# Patient Record
Sex: MALE | Race: OTHER | NOT HISPANIC OR LATINO | URBAN - METROPOLITAN AREA
[De-identification: names, ages, dates, MRNs, and addresses within clinical notes are randomized per-mention and may not be internally consistent; named-entity substitution may affect disease eponyms.]

---

## 2022-09-30 ENCOUNTER — CLINICAL SUPPORT (OUTPATIENT)
Dept: AUDIOLOGY | Facility: CLINIC | Age: 77
End: 2022-09-30
Payer: OTHER GOVERNMENT

## 2022-09-30 ENCOUNTER — OFFICE VISIT (OUTPATIENT)
Dept: OTOLARYNGOLOGY | Facility: CLINIC | Age: 77
End: 2022-09-30
Payer: OTHER GOVERNMENT

## 2022-09-30 DIAGNOSIS — H91.92 DEAFNESS IN LEFT EAR: ICD-10-CM

## 2022-09-30 DIAGNOSIS — H90.A31 MIXED CONDUCTIVE AND SENSORINEURAL HEARING LOSS OF RIGHT EAR WITH RESTRICTED HEARING OF LEFT EAR: ICD-10-CM

## 2022-09-30 DIAGNOSIS — H90.6 MIXED CONDUCTIVE AND SENSORINEURAL HEARING LOSS, BILATERAL: Primary | ICD-10-CM

## 2022-09-30 DIAGNOSIS — H71.01 CHOLESTEATOMA OF ATTIC OF RIGHT EAR: Primary | ICD-10-CM

## 2022-09-30 DIAGNOSIS — H93.11 TINNITUS, SUBJECTIVE, RIGHT: ICD-10-CM

## 2022-09-30 DIAGNOSIS — H92.11 OTORRHEA, RIGHT: ICD-10-CM

## 2022-09-30 PROCEDURE — 92504 EAR MICROSCOPY EXAMINATION: CPT | Mod: PBBFAC | Performed by: OTOLARYNGOLOGY

## 2022-09-30 PROCEDURE — 99204 PR OFFICE/OUTPT VISIT, NEW, LEVL IV, 45-59 MIN: ICD-10-PCS | Mod: 25,57,S$PBB, | Performed by: OTOLARYNGOLOGY

## 2022-09-30 PROCEDURE — 92557 COMPREHENSIVE HEARING TEST: CPT | Mod: PBBFAC

## 2022-09-30 PROCEDURE — 99204 OFFICE O/P NEW MOD 45 MIN: CPT | Mod: 25,57,S$PBB, | Performed by: OTOLARYNGOLOGY

## 2022-09-30 PROCEDURE — 99203 OFFICE O/P NEW LOW 30 MIN: CPT | Mod: PBBFAC,27 | Performed by: OTOLARYNGOLOGY

## 2022-09-30 PROCEDURE — 99999 PR PBB SHADOW E&M-EST. PATIENT-LVL I: CPT | Mod: PBBFAC,,,

## 2022-09-30 PROCEDURE — 92504 PR EAR MICROSCOPY EXAMINATION: ICD-10-PCS | Mod: S$PBB,,, | Performed by: OTOLARYNGOLOGY

## 2022-09-30 PROCEDURE — 92504 EAR MICROSCOPY EXAMINATION: CPT | Mod: S$PBB,,, | Performed by: OTOLARYNGOLOGY

## 2022-09-30 PROCEDURE — 99999 PR PBB SHADOW E&M-EST. PATIENT-LVL I: ICD-10-PCS | Mod: PBBFAC,,,

## 2022-09-30 PROCEDURE — 99999 PR PBB SHADOW E&M-NEW PATIENT-LVL III: ICD-10-PCS | Mod: PBBFAC,,, | Performed by: OTOLARYNGOLOGY

## 2022-09-30 PROCEDURE — 99211 OFF/OP EST MAY X REQ PHY/QHP: CPT | Mod: PBBFAC

## 2022-09-30 PROCEDURE — 92567 TYMPANOMETRY: CPT | Mod: PBBFAC

## 2022-09-30 PROCEDURE — 99999 PR PBB SHADOW E&M-NEW PATIENT-LVL III: CPT | Mod: PBBFAC,,, | Performed by: OTOLARYNGOLOGY

## 2022-09-30 RX ORDER — METOPROLOL TARTRATE 25 MG/1
TABLET, FILM COATED ORAL
COMMUNITY
Start: 2021-12-10 | End: 2022-12-14

## 2022-09-30 RX ORDER — HYDROXYZINE HYDROCHLORIDE 50 MG/1
TABLET, FILM COATED ORAL
COMMUNITY

## 2022-09-30 RX ORDER — ALBUTEROL SULFATE 90 UG/1
AEROSOL, METERED RESPIRATORY (INHALATION)
COMMUNITY
Start: 2021-12-13 | End: 2022-12-14

## 2022-09-30 RX ORDER — NITROGLYCERIN 0.4 MG/1
TABLET SUBLINGUAL
COMMUNITY
Start: 2021-12-10 | End: 2022-12-14

## 2022-09-30 RX ORDER — GABAPENTIN 600 MG/1
TABLET ORAL 2 TIMES DAILY
COMMUNITY
Start: 2021-12-10

## 2022-09-30 RX ORDER — DILTIAZEM HYDROCHLORIDE 120 MG/1
CAPSULE, COATED, EXTENDED RELEASE ORAL
COMMUNITY

## 2022-09-30 RX ORDER — LANOLIN ALCOHOL/MO/W.PET/CERES
1000 CREAM (GRAM) TOPICAL
COMMUNITY
Start: 2021-12-10

## 2022-09-30 RX ORDER — ICOSAPENT ETHYL 1000 MG/1
CAPSULE ORAL
COMMUNITY
Start: 2022-04-29 | End: 2023-04-30

## 2022-09-30 RX ORDER — ACETAMINOPHEN 500 MG
TABLET ORAL
COMMUNITY

## 2022-09-30 RX ORDER — ASPIRIN 81 MG/1
81 TABLET ORAL DAILY
COMMUNITY
Start: 2022-08-22

## 2022-09-30 RX ORDER — ASPIRIN 81 MG/1
TABLET ORAL
COMMUNITY
Start: 2021-12-10 | End: 2022-12-14

## 2022-09-30 RX ORDER — ATORVASTATIN CALCIUM 80 MG/1
TABLET, FILM COATED ORAL
COMMUNITY
Start: 2021-12-10 | End: 2022-12-14

## 2022-09-30 RX ORDER — FLUTICASONE FUROATE, UMECLIDINIUM BROMIDE AND VILANTEROL TRIFENATATE 100; 62.5; 25 UG/1; UG/1; UG/1
1 POWDER RESPIRATORY (INHALATION) DAILY
COMMUNITY
Start: 2022-04-29

## 2022-09-30 RX ORDER — DORZOLAMIDE HCL 20 MG/ML
SOLUTION/ DROPS OPHTHALMIC
COMMUNITY
Start: 2021-10-26 | End: 2022-12-07

## 2022-09-30 RX ORDER — CIPROFLOXACIN AND DEXAMETHASONE 3; 1 MG/ML; MG/ML
SUSPENSION/ DROPS AURICULAR (OTIC)
Status: ON HOLD | COMMUNITY
Start: 2022-08-04 | End: 2022-12-07 | Stop reason: HOSPADM

## 2022-09-30 RX ORDER — TAMSULOSIN HYDROCHLORIDE 0.4 MG/1
CAPSULE ORAL
COMMUNITY
Start: 2021-12-10 | End: 2022-12-14

## 2022-09-30 RX ORDER — LATANOPROST 50 UG/ML
SOLUTION/ DROPS OPHTHALMIC
COMMUNITY
Start: 2021-10-26 | End: 2022-12-07

## 2022-09-30 RX ORDER — ESZOPICLONE 3 MG/1
TABLET, FILM COATED ORAL
COMMUNITY

## 2022-09-30 RX ORDER — HYDROXYZINE PAMOATE 50 MG/1
CAPSULE ORAL
COMMUNITY
Start: 2022-01-25 | End: 2023-01-26

## 2022-09-30 RX ORDER — HYDROCODONE BITARTRATE AND ACETAMINOPHEN 5; 325 MG/1; MG/1
1 TABLET ORAL 2 TIMES DAILY PRN
Status: ON HOLD | COMMUNITY
Start: 2022-09-19 | End: 2022-12-07 | Stop reason: SDUPTHER

## 2022-09-30 RX ORDER — CEPHALEXIN 500 MG/1
500 CAPSULE ORAL 2 TIMES DAILY
COMMUNITY
Start: 2022-06-27

## 2022-09-30 NOTE — PROGRESS NOTES
"Ronnie Kirby was seen today in the clinic for an audiologic evaluation. Mr. Kirby was referred for a cholesteatoma in the attic of the right ear canal. He reported he history of otorrhea from his right ear, which prevents him from wearing a hearing. Mr. Kirby reported tinnitus in the right described as "an airplane taking off". He endorsed a history of noise exposure (i.e. service in the [place] Mr. Kirby reported significant hearing loss in his left ear since the 1960's. He reported a history of a tube placed in the right ear.    Tympanometry revealed Type B with a large ear canal volume in the right ear and Type C in the left ear.     Audiogram results revealed a moderate rising to mild sloping to profound mixed hearing loss in the right ear and a profound rising to severe mixed hearing loss in the left ear.      Speech reception thresholds were noted at 40 dBHL in the right ear and 70 dBHL in the left ear.    Speech discrimination scores were 92% in the right ear and 24% in the left ear.    Recommendations:  Otologic evaluation  Hearing aid consultation pending medical management/clearance  Annual audiogram or sooner if change perceived  Hearing protection in noise        "
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
## 2022-10-03 ENCOUNTER — TELEPHONE (OUTPATIENT)
Dept: OTOLARYNGOLOGY | Facility: CLINIC | Age: 77
End: 2022-10-03
Payer: OTHER GOVERNMENT

## 2022-10-03 DIAGNOSIS — H91.92 DEAFNESS IN LEFT EAR: ICD-10-CM

## 2022-10-03 DIAGNOSIS — H71.01 CHOLESTEATOMA OF ATTIC OF RIGHT EAR: Primary | ICD-10-CM

## 2022-10-19 ENCOUNTER — TELEPHONE (OUTPATIENT)
Dept: OTOLARYNGOLOGY | Facility: CLINIC | Age: 77
End: 2022-10-19
Payer: OTHER GOVERNMENT

## 2022-10-19 NOTE — TELEPHONE ENCOUNTER
Spoke with patients  wife to follow up on medical clearance for surgery. Wife stated patient was unable to receive clearance for surgery.will need new surgery date.3rd attempt to  Fax clearance form over to cardiology.

## 2022-10-26 ENCOUNTER — TELEPHONE (OUTPATIENT)
Dept: OTOLARYNGOLOGY | Facility: CLINIC | Age: 77
End: 2022-10-26
Payer: OTHER GOVERNMENT

## 2022-11-28 ENCOUNTER — PATIENT MESSAGE (OUTPATIENT)
Dept: AUDIOLOGY | Facility: CLINIC | Age: 77
End: 2022-11-28
Payer: OTHER GOVERNMENT

## 2022-12-03 ENCOUNTER — NURSE TRIAGE (OUTPATIENT)
Dept: ADMINISTRATIVE | Facility: CLINIC | Age: 77
End: 2022-12-03
Payer: OTHER GOVERNMENT

## 2022-12-03 NOTE — TELEPHONE ENCOUNTER
Wife calling with questions regarding upcoming surgery 12/7 with Dr. Mckeon. Asking if there are meds he is supposed to stop and NPO status. Wife concerned about driving from [place] without instructions. Unable to locate information on chart, no pre admit done at this time. Per care advice, call pcp when office is open.     Reason for Disposition   [1] Caller requesting NON-URGENT health information AND [2] PCP's office is the best resource    Protocols used: Information Only Call - No Triage-A-

## 2022-12-05 ENCOUNTER — TELEPHONE (OUTPATIENT)
Dept: OTOLARYNGOLOGY | Facility: CLINIC | Age: 77
End: 2022-12-05
Payer: OTHER GOVERNMENT

## 2022-12-05 NOTE — TELEPHONE ENCOUNTER
Spoke with pt to give pre op instructions. Advised NPO after midnight and to bring in all current medications and confirmed pt stopped blood thinners. Surgery time, arrival time, and surgery location verified.

## 2022-12-06 ENCOUNTER — ANESTHESIA EVENT (OUTPATIENT)
Dept: SURGERY | Facility: HOSPITAL | Age: 77
End: 2022-12-06
Payer: OTHER GOVERNMENT

## 2022-12-06 NOTE — ANESTHESIA PREPROCEDURE EVALUATION
[facility]  Anesthesia Pre-Operative Evaluation         Patient Name/: Ronnie Kirby, 1945  MRN: [id number]    SUBJECTIVE:     Pre-operative evaluation for Procedure(s) (LRB):  TYMPANOPLASTY, WITH MASTOIDECTOMY (Right)     2022    Ronnie Kirby is a 77 y.o. male w/ a significant PMHx of HTN, HLD, CAD s/p CABG, obesity, COPD, BPH and right ear cholesteatoma.     Patient now presents for the above procedure(s).      Prev airway: None documented.    There is no problem list on file for this patient.      Review of patient's allergies indicates:   Allergen Reactions    Brimonidine Other (See Comments)       Current Inpatient Medications:       No current facility-administered medications on file prior to encounter.     Current Outpatient Medications on File Prior to Encounter   Medication Sig Dispense Refill    albuterol (PROVENTIL/VENTOLIN HFA) 90 mcg/actuation inhaler INHALE 2 INHALATIONS BY ORAL INHALATION  AS NEEDED FOR BREATHING      aspirin (ECOTRIN) 81 MG EC tablet TAKE ONE TABLET BY MOUTH DAILY TO PREVENT BLOOD CLOTS      atorvastatin (LIPITOR) 80 MG tablet TAKE ONE TABLET BY MOUTH DAILY FOR CHOLESTEROL **DOSE INCREASE**      cholecalciferol, vitamin D3, 125 mcg (5,000 unit) Tab       ciprofloxacin-dexamethasone 0.3-0.1% (CIPRODEX) 0.3-0.1 % DrpS INSTILL 4 DROPS IN RIGHT EAR TWICE A DAY AS NEEDED FOR EAR INFECTION      cyanocobalamin (VITAMIN B-12) 1000 MCG tablet 1,000 mcg.      dorzolamide (TRUSOPT) 2 % ophthalmic solution INSTILL 1 DROP IN BOTH EYES TWICE A DAY FOR GLAUCOMA      eszopiclone (LUNESTA) 3 mg Tab       gabapentin (NEURONTIN) 600 MG tablet 2 (two) times daily. 1/2 tablet      HYDROcodone-acetaminophen (NORCO) 5-325 mg per tablet Take 1 tablet by mouth 2 (two) times daily as needed.      hydrOXYzine (ATARAX) 50 MG tablet       hydrOXYzine pamoate (VISTARIL) 50 MG Cap TAKE ONE CAPSULE BY MOUTH DAILY FOR ANXIETY      icosapent ethyL (VASCEPA) 1 gram Cap  TAKE TWO CAPSULES BY MOUTH TWICE A DAY FOR HEART HEALTH AND HIGH TRIGLYCERIDES.      latanoprost 0.005 % ophthalmic solution INSTILL 1 DROP IN BOTH EYES AT BEDTIME FOR GLAUCOMA      metoprolol tartrate (LOPRESSOR) 25 MG tablet TAKE ONE TABLET BY MOUTH TWICE A DAY FOR HEART AND BLOOD PRESSURE      OXYGEN-AIR DELIVERY SYSTEMS MISC 3 L by Misc.(Non-Drug; Combo Route) route every evening.      tamsulosin (FLOMAX) 0.4 mg Cap Takes 2 tabs      TRELEGY ELLIPTA 100-62.5-25 mcg DsDv Inhale 1 puff into the lungs once daily.      aspirin (ECOTRIN) 81 MG EC tablet Take 81 mg by mouth once daily.      cephALEXin (KEFLEX) 500 MG capsule Take 500 mg by mouth 2 (two) times daily.      diltiaZEM (CARDIZEM CD) 120 MG Cp24 Take by mouth.      nitroGLYCERIN (NITROSTAT) 0.4 MG SL tablet DISSOLVE ONE TABLET UNDER THE TONGUE EVERY 5 MINUTES AS NEEDED TO MAX OF 3 TABS FOR CHEST PAIN      semaglutide (OZEMPIC) 0.25 mg or 0.5 mg(2 mg/1.5 mL) pen injector          Past Surgical History:   Procedure Laterality Date    BIOPSY OF TONSILS      CORONARY ARTERY BYPASS GRAFT      EXTRACORPOREAL SHOCK WAVE LITHOTRIPSY      HERNIA REPAIR         Social History:  Tobacco Use: Medium Risk    Smoking Tobacco Use: Former    Smokeless Tobacco Use: Never    Passive Exposure: Not on file       Alcohol Use: Not on file       OBJECTIVE:     Vital Signs Range:  BMI Readings from Last 1 Encounters:   12/07/22 34.01 kg/m²       Temp:  [36.1 °C (97 °F)]   Pulse:  [70]   Resp:  [22]   BP: (148)/(81)   SpO2:  [99 %]        Significant Labs:    No results found for: WBC, HGB, HCT, PLT, NA, K, CL, CO2, GLU, BUN, CREATININE, MG, PHOS, CALCIUM, ALBUMIN, PROT, ALKPHOS, BILITOT, AST, ALT, INR, HGBA1C     Please see Results Review for additional labs.     Diagnostic Studies: No relevant studies.    EKG:   No results found for this or any previous visit.    ECHO:  No results found for this or any previous visit.        ASSESSMENT/PLAN:       Pre-op  Assessment    I have reviewed the Patient Summary Reports.     I have reviewed the Nursing Notes.    I have reviewed the Medications.     Review of Systems  Anesthesia Hx:  No problems with previous Anesthesia  Neg history of prior surgery. Denies Family Hx of Anesthesia complications.    Social:  No Alcohol Use, Non-Smoker    Hematology/Oncology:  Hematology Normal   Oncology Normal     EENT/Dental:EENT/Dental Normal   Cardiovascular:   Hypertension hyperlipidemia    Pulmonary:  Pulmonary Normal  Denies COPD.  Denies Asthma.    Renal/:   BPH    Hepatic/GI:  Hepatic/GI Normal Denies Liver Disease.    Musculoskeletal:  Musculoskeletal Normal    Neurological:  Neurology Normal  Denies CVA. Denies Seizures.    Endocrine:  Endocrine Normal Denies Diabetes.        Physical Exam  General: Well nourished and Cooperative    Airway:  Mallampati: II   Mouth Opening: Normal  TM Distance: Normal  Tongue: Normal  Neck ROM: Normal ROM    Dental:  Dentures    Chest/Lungs:  Normal Respiratory Rate    Heart:  Rate: Normal  Rhythm: Regular Rhythm        Anesthesia Plan  Type of Anesthesia, risks & benefits discussed:    Anesthesia Type: Gen ETT  Intra-op Monitoring Plan: Standard ASA Monitors  Post Op Pain Control Plan: multimodal analgesia and IV/PO Opioids PRN  Induction:  IV  Airway Plan: Direct, Post-Induction  Informed Consent: Informed consent signed with the Patient and all parties understand the risks and agree with anesthesia plan.  All questions answered.   ASA Score: 3  Day of Surgery Review of History & Physical: H&P Update referred to the surgeon/provider.    Ready For Surgery From Anesthesia Perspective.     .

## 2022-12-07 ENCOUNTER — HOSPITAL ENCOUNTER (OUTPATIENT)
Facility: HOSPITAL | Age: 77
Discharge: HOME | End: 2022-12-07
Attending: OTOLARYNGOLOGY | Admitting: OTOLARYNGOLOGY
Payer: OTHER GOVERNMENT

## 2022-12-07 ENCOUNTER — ANESTHESIA (OUTPATIENT)
Dept: SURGERY | Facility: HOSPITAL | Age: 77
End: 2022-12-07
Payer: OTHER GOVERNMENT

## 2022-12-07 VITALS
WEIGHT: 237 LBS | TEMPERATURE: 98 F | SYSTOLIC BLOOD PRESSURE: 144 MMHG | BODY MASS INDEX: 33.93 KG/M2 | RESPIRATION RATE: 18 BRPM | DIASTOLIC BLOOD PRESSURE: 76 MMHG | OXYGEN SATURATION: 96 % | HEART RATE: 75 BPM | HEIGHT: 70 IN

## 2022-12-07 DIAGNOSIS — H71.90 CHOLESTEATOMA: ICD-10-CM

## 2022-12-07 DIAGNOSIS — H71.01 CHOLESTEATOMA OF ATTIC OF RIGHT EAR: Primary | ICD-10-CM

## 2022-12-07 LAB — POCT GLUCOSE: 121 MG/DL (ref 70–110)

## 2022-12-07 PROCEDURE — 69645 REVISE MIDDLE EAR & MASTOID: CPT | Mod: RT,,, | Performed by: OTOLARYNGOLOGY

## 2022-12-07 PROCEDURE — 88304 TISSUE EXAM BY PATHOLOGIST: CPT | Mod: 26,,, | Performed by: PATHOLOGY

## 2022-12-07 PROCEDURE — 27201423 OPTIME MED/SURG SUP & DEVICES STERILE SUPPLY: Performed by: OTOLARYNGOLOGY

## 2022-12-07 PROCEDURE — 82962 GLUCOSE BLOOD TEST: CPT | Performed by: OTOLARYNGOLOGY

## 2022-12-07 PROCEDURE — 63600175 PHARM REV CODE 636 W HCPCS: Performed by: STUDENT IN AN ORGANIZED HEALTH CARE EDUCATION/TRAINING PROGRAM

## 2022-12-07 PROCEDURE — 63600175 PHARM REV CODE 636 W HCPCS: Performed by: ANESTHESIOLOGY

## 2022-12-07 PROCEDURE — 25000003 PHARM REV CODE 250: Performed by: STUDENT IN AN ORGANIZED HEALTH CARE EDUCATION/TRAINING PROGRAM

## 2022-12-07 PROCEDURE — 37000008 HC ANESTHESIA 1ST 15 MINUTES: Performed by: OTOLARYNGOLOGY

## 2022-12-07 PROCEDURE — 25000003 PHARM REV CODE 250: Performed by: NURSE ANESTHETIST, CERTIFIED REGISTERED

## 2022-12-07 PROCEDURE — 63600175 PHARM REV CODE 636 W HCPCS: Performed by: NURSE ANESTHETIST, CERTIFIED REGISTERED

## 2022-12-07 PROCEDURE — 00120 ANES PX EAR W/BX NOS: CPT | Performed by: OTOLARYNGOLOGY

## 2022-12-07 PROCEDURE — 71000016 HC POSTOP RECOV ADDL HR: Performed by: OTOLARYNGOLOGY

## 2022-12-07 PROCEDURE — 36000708 HC OR TIME LEV III 1ST 15 MIN: Performed by: OTOLARYNGOLOGY

## 2022-12-07 PROCEDURE — D9220A PRA ANESTHESIA: Mod: ANES,,, | Performed by: ANESTHESIOLOGY

## 2022-12-07 PROCEDURE — D9220A PRA ANESTHESIA: ICD-10-PCS | Mod: ANES,,, | Performed by: ANESTHESIOLOGY

## 2022-12-07 PROCEDURE — 37000009 HC ANESTHESIA EA ADD 15 MINS: Performed by: OTOLARYNGOLOGY

## 2022-12-07 PROCEDURE — D9220A PRA ANESTHESIA: Mod: CRNA,,, | Performed by: NURSE ANESTHETIST, CERTIFIED REGISTERED

## 2022-12-07 PROCEDURE — 71000015 HC POSTOP RECOV 1ST HR: Performed by: OTOLARYNGOLOGY

## 2022-12-07 PROCEDURE — 71000044 HC DOSC ROUTINE RECOVERY FIRST HOUR: Performed by: OTOLARYNGOLOGY

## 2022-12-07 PROCEDURE — 69645 PR TYMPANOPLAS/MASTOIDEC,RADICAL/COMPLE: ICD-10-PCS | Mod: RT,,, | Performed by: OTOLARYNGOLOGY

## 2022-12-07 PROCEDURE — 88304 TISSUE EXAM BY PATHOLOGIST: CPT | Performed by: PATHOLOGY

## 2022-12-07 PROCEDURE — 88304 PR  SURG PATH,LEVEL III: ICD-10-PCS | Mod: 26,,, | Performed by: PATHOLOGY

## 2022-12-07 PROCEDURE — D9220A PRA ANESTHESIA: ICD-10-PCS | Mod: CRNA,,, | Performed by: NURSE ANESTHETIST, CERTIFIED REGISTERED

## 2022-12-07 PROCEDURE — 25000003 PHARM REV CODE 250: Performed by: OTOLARYNGOLOGY

## 2022-12-07 PROCEDURE — 36000709 HC OR TIME LEV III EA ADD 15 MIN: Performed by: OTOLARYNGOLOGY

## 2022-12-07 RX ORDER — LIDOCAINE HYDROCHLORIDE 10 MG/ML
1 INJECTION, SOLUTION EPIDURAL; INFILTRATION; INTRACAUDAL; PERINEURAL ONCE
Status: DISCONTINUED | OUTPATIENT
Start: 2022-12-07 | End: 2022-12-07 | Stop reason: HOSPADM

## 2022-12-07 RX ORDER — SODIUM CHLORIDE 0.9 % (FLUSH) 0.9 %
10 SYRINGE (ML) INJECTION
Status: DISCONTINUED | OUTPATIENT
Start: 2022-12-07 | End: 2022-12-07 | Stop reason: HOSPADM

## 2022-12-07 RX ORDER — SODIUM CHLORIDE 0.9 % (FLUSH) 0.9 %
3 SYRINGE (ML) INJECTION
Status: DISCONTINUED | OUTPATIENT
Start: 2022-12-07 | End: 2022-12-07 | Stop reason: HOSPADM

## 2022-12-07 RX ORDER — VASOPRESSIN 20 [USP'U]/ML
INJECTION, SOLUTION INTRAMUSCULAR; SUBCUTANEOUS
Status: DISCONTINUED | OUTPATIENT
Start: 2022-12-07 | End: 2022-12-07

## 2022-12-07 RX ORDER — FENTANYL CITRATE 50 UG/ML
25 INJECTION, SOLUTION INTRAMUSCULAR; INTRAVENOUS EVERY 5 MIN PRN
Status: DISCONTINUED | OUTPATIENT
Start: 2022-12-07 | End: 2022-12-07 | Stop reason: HOSPADM

## 2022-12-07 RX ORDER — DEXAMETHASONE SODIUM PHOSPHATE 4 MG/ML
INJECTION, SOLUTION INTRA-ARTICULAR; INTRALESIONAL; INTRAMUSCULAR; INTRAVENOUS; SOFT TISSUE
Status: DISCONTINUED | OUTPATIENT
Start: 2022-12-07 | End: 2022-12-07

## 2022-12-07 RX ORDER — ONDANSETRON 2 MG/ML
INJECTION INTRAMUSCULAR; INTRAVENOUS
Status: DISCONTINUED | OUTPATIENT
Start: 2022-12-07 | End: 2022-12-07

## 2022-12-07 RX ORDER — HYDROCODONE BITARTRATE AND ACETAMINOPHEN 5; 325 MG/1; MG/1
1 TABLET ORAL EVERY 6 HOURS PRN
Qty: 20 TABLET | Refills: 0 | Status: SHIPPED | OUTPATIENT
Start: 2022-12-07

## 2022-12-07 RX ORDER — LIDOCAINE HYDROCHLORIDE 20 MG/ML
INJECTION INTRAVENOUS
Status: DISCONTINUED | OUTPATIENT
Start: 2022-12-07 | End: 2022-12-07

## 2022-12-07 RX ORDER — OFLOXACIN 3 MG/ML
5 SOLUTION AURICULAR (OTIC) DAILY
Qty: 10 ML | Refills: 3 | Status: SHIPPED | OUTPATIENT
Start: 2022-12-07

## 2022-12-07 RX ORDER — KETAMINE HCL IN 0.9 % NACL 50 MG/5 ML
SYRINGE (ML) INTRAVENOUS
Status: DISCONTINUED | OUTPATIENT
Start: 2022-12-07 | End: 2022-12-07

## 2022-12-07 RX ORDER — CEFAZOLIN SODIUM 1 G/3ML
INJECTION, POWDER, FOR SOLUTION INTRAMUSCULAR; INTRAVENOUS
Status: DISCONTINUED | OUTPATIENT
Start: 2022-12-07 | End: 2022-12-07

## 2022-12-07 RX ORDER — PHENYLEPHRINE HYDROCHLORIDE 10 MG/ML
INJECTION INTRAVENOUS
Status: DISCONTINUED | OUTPATIENT
Start: 2022-12-07 | End: 2022-12-07

## 2022-12-07 RX ORDER — FENTANYL CITRATE 50 UG/ML
INJECTION, SOLUTION INTRAMUSCULAR; INTRAVENOUS
Status: DISCONTINUED | OUTPATIENT
Start: 2022-12-07 | End: 2022-12-07

## 2022-12-07 RX ORDER — LIDOCAINE HYDROCHLORIDE AND EPINEPHRINE 10; 10 MG/ML; UG/ML
INJECTION, SOLUTION INFILTRATION; PERINEURAL
Status: DISCONTINUED | OUTPATIENT
Start: 2022-12-07 | End: 2022-12-07 | Stop reason: HOSPADM

## 2022-12-07 RX ORDER — ACETAMINOPHEN 10 MG/ML
INJECTION, SOLUTION INTRAVENOUS
Status: DISCONTINUED | OUTPATIENT
Start: 2022-12-07 | End: 2022-12-07

## 2022-12-07 RX ORDER — ONDANSETRON 4 MG/1
4 TABLET, ORALLY DISINTEGRATING ORAL EVERY 8 HOURS PRN
Qty: 20 TABLET | Refills: 0 | Status: SHIPPED | OUTPATIENT
Start: 2022-12-07

## 2022-12-07 RX ORDER — PROPOFOL 10 MG/ML
VIAL (ML) INTRAVENOUS
Status: DISCONTINUED | OUTPATIENT
Start: 2022-12-07 | End: 2022-12-07

## 2022-12-07 RX ORDER — DEXMEDETOMIDINE HYDROCHLORIDE 100 UG/ML
INJECTION, SOLUTION INTRAVENOUS
Status: DISCONTINUED | OUTPATIENT
Start: 2022-12-07 | End: 2022-12-07

## 2022-12-07 RX ORDER — SUCCINYLCHOLINE CHLORIDE 20 MG/ML
INJECTION INTRAMUSCULAR; INTRAVENOUS
Status: DISCONTINUED | OUTPATIENT
Start: 2022-12-07 | End: 2022-12-07

## 2022-12-07 RX ORDER — HYDROCODONE BITARTRATE AND ACETAMINOPHEN 5; 325 MG/1; MG/1
1 TABLET ORAL EVERY 4 HOURS PRN
Status: DISCONTINUED | OUTPATIENT
Start: 2022-12-07 | End: 2022-12-07 | Stop reason: HOSPADM

## 2022-12-07 RX ORDER — SODIUM CHLORIDE 9 MG/ML
INJECTION, SOLUTION INTRAVENOUS CONTINUOUS
Status: DISCONTINUED | OUTPATIENT
Start: 2022-12-07 | End: 2022-12-07 | Stop reason: HOSPADM

## 2022-12-07 RX ORDER — OFLOXACIN 3 MG/ML
SOLUTION/ DROPS OPHTHALMIC
Status: DISCONTINUED | OUTPATIENT
Start: 2022-12-07 | End: 2022-12-07 | Stop reason: HOSPADM

## 2022-12-07 RX ADMIN — FENTANYL CITRATE 25 MCG: 50 INJECTION INTRAMUSCULAR; INTRAVENOUS at 05:12

## 2022-12-07 RX ADMIN — PHENYLEPHRINE HYDROCHLORIDE 100 MCG: 10 INJECTION INTRAVENOUS at 01:12

## 2022-12-07 RX ADMIN — DEXMEDETOMIDINE HYDROCHLORIDE 8 MCG: 100 INJECTION, SOLUTION INTRAVENOUS at 01:12

## 2022-12-07 RX ADMIN — VASOPRESSIN 2 UNITS: 20 INJECTION INTRAVENOUS at 01:12

## 2022-12-07 RX ADMIN — DEXMEDETOMIDINE HYDROCHLORIDE 8 MCG: 100 INJECTION, SOLUTION INTRAVENOUS at 03:12

## 2022-12-07 RX ADMIN — PROPOFOL 200 MG: 10 INJECTION, EMULSION INTRAVENOUS at 12:12

## 2022-12-07 RX ADMIN — VASOPRESSIN 1 UNITS: 20 INJECTION INTRAVENOUS at 01:12

## 2022-12-07 RX ADMIN — SUCCINYLCHOLINE CHLORIDE 200 MG: 20 INJECTION, SOLUTION INTRAMUSCULAR; INTRAVENOUS at 12:12

## 2022-12-07 RX ADMIN — SODIUM CHLORIDE 1 MCG/KG/MIN: 9 INJECTION, SOLUTION INTRAVENOUS at 02:12

## 2022-12-07 RX ADMIN — DEXAMETHASONE SODIUM PHOSPHATE 4 MG: 4 INJECTION, SOLUTION INTRAMUSCULAR; INTRAVENOUS at 12:12

## 2022-12-07 RX ADMIN — PHENYLEPHRINE HYDROCHLORIDE 200 MCG: 10 INJECTION INTRAVENOUS at 02:12

## 2022-12-07 RX ADMIN — DEXMEDETOMIDINE HYDROCHLORIDE 8 MCG: 100 INJECTION, SOLUTION INTRAVENOUS at 02:12

## 2022-12-07 RX ADMIN — ACETAMINOPHEN 1000 MG: 10 INJECTION INTRAVENOUS at 01:12

## 2022-12-07 RX ADMIN — Medication 30 MG: at 01:12

## 2022-12-07 RX ADMIN — LIDOCAINE HYDROCHLORIDE 100 MG: 20 INJECTION INTRAVENOUS at 12:12

## 2022-12-07 RX ADMIN — Medication 10 MG: at 04:12

## 2022-12-07 RX ADMIN — FENTANYL CITRATE 100 MCG: 50 INJECTION, SOLUTION INTRAMUSCULAR; INTRAVENOUS at 12:12

## 2022-12-07 RX ADMIN — Medication 10 MG: at 03:12

## 2022-12-07 RX ADMIN — ONDANSETRON 4 MG: 2 INJECTION INTRAMUSCULAR; INTRAVENOUS at 02:12

## 2022-12-07 RX ADMIN — VASOPRESSIN 2 UNITS: 20 INJECTION INTRAVENOUS at 03:12

## 2022-12-07 RX ADMIN — SODIUM CHLORIDE, SODIUM GLUCONATE, SODIUM ACETATE, POTASSIUM CHLORIDE, MAGNESIUM CHLORIDE, SODIUM PHOSPHATE, DIBASIC, AND POTASSIUM PHOSPHATE: .53; .5; .37; .037; .03; .012; .00082 INJECTION, SOLUTION INTRAVENOUS at 02:12

## 2022-12-07 RX ADMIN — PHENYLEPHRINE HYDROCHLORIDE 100 MCG: 10 INJECTION INTRAVENOUS at 12:12

## 2022-12-07 RX ADMIN — VASOPRESSIN 1 UNITS: 20 INJECTION INTRAVENOUS at 04:12

## 2022-12-07 RX ADMIN — SODIUM CHLORIDE: 0.9 INJECTION, SOLUTION INTRAVENOUS at 12:12

## 2022-12-07 RX ADMIN — PHENYLEPHRINE HYDROCHLORIDE 100 MCG: 10 INJECTION INTRAVENOUS at 03:12

## 2022-12-07 RX ADMIN — VASOPRESSIN 3 UNITS: 20 INJECTION INTRAVENOUS at 02:12

## 2022-12-07 RX ADMIN — CEFAZOLIN 2 G: 330 INJECTION, POWDER, FOR SOLUTION INTRAMUSCULAR; INTRAVENOUS at 01:12

## 2022-12-07 RX ADMIN — Medication 20 MG: at 01:12

## 2022-12-07 RX ADMIN — VASOPRESSIN 1 UNITS: 20 INJECTION INTRAVENOUS at 03:12

## 2022-12-07 RX ADMIN — PROPOFOL 20 MG: 10 INJECTION, EMULSION INTRAVENOUS at 01:12

## 2022-12-07 NOTE — OR NURSING
Dr Duckworth  with ENT was called after pt woke and ripped off ear cover.    I requested MD to assess pt.  Dr Duckworth said it was not a problem- just cover before discharge. Pt continued to curse and pick at ear.  Wife was called to bed to help calm pt. Charge nurse also at bedside.
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
## 2022-12-07 NOTE — TRANSFER OF CARE
"Anesthesia Transfer of Care Note    Patient: Ronnie Kirby    Procedure(s) Performed: Procedure(s) (LRB):  TYMPANOPLASTY, WITH MASTOIDECTOMY (Right)    Patient location: PACU    Anesthesia Type: general    Transport from OR: Transported from OR on 6-10 L/min O2 by face mask with adequate spontaneous ventilation    Post pain: adequate analgesia    Post assessment: no apparent anesthetic complications and tolerated procedure well    Post vital signs: stable    Level of consciousness: sedated and responds to stimulation    Nausea/Vomiting: no nausea/vomiting    Complications: none    Transfer of care protocol was followed      Last vitals:   Visit Vitals  /88   Pulse 80   Temp (P) 36.5 °C (97.7 °F)   Resp (P) 20   Ht 5' 10" (1.778 m)   Wt 107.5 kg (237 lb)   SpO2 (!) 94%   BMI 34.01 kg/m²     "

## 2022-12-07 NOTE — ANESTHESIA PROCEDURE NOTES
Intubation    Date/Time: 12/7/2022 12:47 PM  Performed by: John Iniguez DO  Authorized by: Reji Palacios MD     Intubation:     Induction:  Intravenous    Intubated:  Postinduction    Mask Ventilation:  Moderately difficult with oral airway    Attempts:  1    Attempted By:  Resident anesthesiologist    Method of Intubation:  Direct    Blade:  Bryce 4    Laryngeal View Grade: Grade I - full view of cords      Difficult Airway Encountered?: No      Complications:  None    Airway Device Size:  7.5    Style/Cuff Inflation:  Cuffed (inflated to minimal occlusive pressure)    Placement Verified By:  Capnometry    Complicating Factors:  None    Findings Post-Intubation:  BS equal bilateral and atraumatic/condition of teeth unchanged

## 2022-12-07 NOTE — ANESTHESIA POSTPROCEDURE EVALUATION
Anesthesia Post Evaluation    Patient: Ronnie Kirby    Procedure(s) Performed: Procedure(s) (LRB):  TYMPANOPLASTY, WITH MASTOIDECTOMY (Right)    Final Anesthesia Type: general      Patient location during evaluation: PACU  Patient participation: Yes- Able to Participate  Level of consciousness: awake and alert and oriented  Post-procedure vital signs: reviewed and stable  Pain management: adequate  Airway patency: patent    PONV status at discharge: No PONV  Anesthetic complications: no      Cardiovascular status: hemodynamically stable  Respiratory status: unassisted and spontaneous ventilation  Hydration status: euvolemic  Follow-up not needed.          Vitals Value Taken Time   /88 12/07/22 1651   Temp 36.5 °C (97.7 °F) 12/07/22 1651   Pulse 82 12/07/22 1655   Resp 18 12/07/22 1655   SpO2 95 % 12/07/22 1655   Vitals shown include unvalidated device data.      No case tracking events are documented in the log.      Pain/Ian Score: No data recorded

## 2022-12-07 NOTE — BRIEF OP NOTE
[facility] - Surgery ([facility])  Brief Operative Note    Surgery Date: 12/7/2022     Surgeon(s) and Role:     * Colton Jalloh MD - Primary    Assisting Surgeon: None    Pre-op Diagnosis:  Cholesteatoma of attic of right ear [H71.01]    Post-op Diagnosis:  Post-Op Diagnosis Codes:     * Cholesteatoma of attic of right ear [H71.01]    Procedure(s) (LRB):  TYMPANOPLASTY, WITH MASTOIDECTOMY (Right)    Anesthesia: General    Operative Findings: see op note for full details    Estimated Blood Loss: 15 mL         Specimens:   Specimen (24h ago, onward)       Start     Ordered    12/07/22 1531  Specimen to Pathology, Surgery ENT  Once        Comments: Pre-op Diagnosis: Cholesteatoma of attic of right ear [H71.01]Procedure(s):TYMPANOPLASTY, WITH MASTOIDECTOMY Number of specimens: 1Name of specimens: 1. RIGHT EAR CHOLESTEATOMA -PERM     References:    Click here for ordering Quick Tip   Question Answer Comment   Procedure Type: ENT    Specimen Class: Routine/Screening    Which provider would you like to cc? COLTON JALLOH    Release to patient Immediate        12/07/22 1532                      Discharge Note    OUTCOME: Patient tolerated treatment/procedure well without complication and is now ready for discharge.    DISPOSITION: Home or Self Care    FINAL DIAGNOSIS:  Cholesteatoma of attic of right ear    FOLLOWUP: In clinic    DISCHARGE INSTRUCTIONS:    Discharge Procedure Orders   Diet general     Call MD for:  redness, tenderness, or signs of infection (pain, swelling, redness, odor or green/yellow discharge around incision site)

## 2022-12-07 NOTE — PATIENT INSTRUCTIONS
Tympanoplasty or Mastoidectomy Post-operative Instructions    Precautions   Do not blow your nose until your physician has indicated that your ear is healed.  Any accumulated secretions in the nose may be drawn back into the throat and expectorated if desired.  This particularly important if you develop a cold.   Do not pop your ears by holding your nose and blowing air through the eustachian tube into the ear.  If it is necessary to sneeze, do so with your mouth open.  Do not allow water to enter the ear until advised by your physician that he ear is healed.  Until such time, when showering or washing your hair, cotton may be placed in the outer ear opening and covered in Vaseline.  If an incision was made in the skin behind your ear, water should be kept away from this area for 1 week.  Do not take an unnecessary chance of catching a cold.  Avoid undue exposure or fatigue.  Should you catch a cold, treat it in your usual way, reporting to your physician if you develop ear symptoms  You may anticipate a certain amount of pulsation, popping, clicking, and other sounds in the ear, and a feeling of fullness in the ear.  Occasional sharp shooting pains are not unusual.  Sometimes it may feel as if there is liquid in the ear.  Do not plan to drive a car home from the hospital.  Air travel is ok 2 days after surgery.  When changing altitude, you should remain awake and chew gum to stimulate swallowing.  Dizziness  Minor dizziness may be present on head motion and not concern you unless it increases  Hearing  Hearing may be worse temporarily after surgery due to swelling and packing in the ear canal.  An improvement may be noted after 6-8 weeks, while maximum improvement may take up to 4-6 months.  Discharge  A bloody or Watery discharge may occur during the healing period.  The outer ear cotton may be changed if necessary   A purulent discharge at any time is an indication to call to make an appointment  Pain  Mild,  intermittent ear pain is not unusual during the first 2 weeks.  Pain above or in front of the ear is common when chewing.  If you have persistent unrelenting pain please let your physician know  Ear Drops  If you were given ear drops please start 1 week after surgery.  Place a few drops in the ear twice a day to loosen the packing which will run out of the ear as a liquid.  Tip the head to the side, place two drops in the ear, and allow them to remain for 5 minutes.  The tip the head to the opposite direction to allow the drops to run out.  Continue using until advised otherwise by your physician.

## 2022-12-07 NOTE — PLAN OF CARE
Upon entering room pt is finishing getting undressed. Pt is SOB. States he gets SOB when walking. O2 sat 99%. Pt wears O2 at 3 liters at night.

## 2022-12-15 LAB
FINAL PATHOLOGIC DIAGNOSIS: NORMAL
Lab: NORMAL

## 2022-12-16 ENCOUNTER — OFFICE VISIT (OUTPATIENT)
Dept: OTOLARYNGOLOGY | Facility: CLINIC | Age: 77
End: 2022-12-16
Payer: OTHER GOVERNMENT

## 2022-12-16 DIAGNOSIS — Z09 POSTOPERATIVE EXAMINATION: Primary | ICD-10-CM

## 2022-12-16 PROCEDURE — 99024 PR POST-OP FOLLOW-UP VISIT: ICD-10-PCS | Mod: 95,,, | Performed by: OTOLARYNGOLOGY

## 2022-12-16 PROCEDURE — 99024 POSTOP FOLLOW-UP VISIT: CPT | Mod: 95,,, | Performed by: OTOLARYNGOLOGY

## 2022-12-18 NOTE — PROGRESS NOTES
The patient location is: [place]  The chief complaint leading to consultation is: post op    Visit type: audiovisual    Face to Face time with patient: 10 minutes minutes of total time spent on the encounter, which includes face to face time and non-face to face time preparing to see the patient (eg, review of tests), Obtaining and/or reviewing separately obtained history, Documenting clinical information in the electronic or other health record, Independently interpreting results (not separately reported) and communicating results to the patient/family/caregiver, or Care coordination (not separately reported).         Each patient to whom he or she provides medical services by telemedicine is:  (1) informed of the relationship between the physician and patient and the respective role of any other health care provider with respect to management of the patient; and (2) notified that he or she may decline to receive medical services by telemedicine and may withdraw from such care at any time.    Notes:      Pain controlled, no bleeding, reports muffled hearing.     Steri strips removed. No evidence of hematoma or seroma. No evidence of infection. Packing is dry and intact. Start ototopical drops and re check in three weeks.

## 2023-01-13 ENCOUNTER — PATIENT MESSAGE (OUTPATIENT)
Dept: OTOLARYNGOLOGY | Facility: CLINIC | Age: 78
End: 2023-01-13
Payer: OTHER GOVERNMENT

## 2023-01-24 ENCOUNTER — OFFICE VISIT (OUTPATIENT)
Dept: OTOLARYNGOLOGY | Facility: CLINIC | Age: 78
End: 2023-01-24
Payer: OTHER GOVERNMENT

## 2023-01-24 DIAGNOSIS — H71.01 CHOLESTEATOMA OF ATTIC OF RIGHT EAR: Primary | ICD-10-CM

## 2023-01-24 DIAGNOSIS — Z09 POSTOPERATIVE EXAMINATION: ICD-10-CM

## 2023-01-24 PROCEDURE — 99212 OFFICE O/P EST SF 10 MIN: CPT | Mod: PBBFAC | Performed by: OTOLARYNGOLOGY

## 2023-01-24 PROCEDURE — 99024 PR POST-OP FOLLOW-UP VISIT: ICD-10-PCS | Mod: ,,, | Performed by: OTOLARYNGOLOGY

## 2023-01-24 PROCEDURE — 99999 PR PBB SHADOW E&M-EST. PATIENT-LVL II: ICD-10-PCS | Mod: PBBFAC,,, | Performed by: OTOLARYNGOLOGY

## 2023-01-24 PROCEDURE — 99024 POSTOP FOLLOW-UP VISIT: CPT | Mod: ,,, | Performed by: OTOLARYNGOLOGY

## 2023-01-24 PROCEDURE — 99999 PR PBB SHADOW E&M-EST. PATIENT-LVL II: CPT | Mod: PBBFAC,,, | Performed by: OTOLARYNGOLOGY

## 2023-01-24 NOTE — PROGRESS NOTES
Ronnie Kirby is a 77 y.o. male patient.   1. Cholesteatoma of attic of right ear    2. Postoperative examination      Procedure:    Modified radical mastoidectomy     Operative Findings (including complications, if any):   Attic cholesteatoma with partial erosion of incus  Canal wall down performed with preservation of ossicular chain.    Mastoid partially obliterated with bone naqvi.  Meatoplasty performed    Subjective  Here for f/u 6 wk s/p CWD mastoidectomy. Doing well. Denies otorrhea, hearing improving.    Objective    Post auricular incision healing well.    Packing vacuumed out of ear with microscope.    Graft intact and healing well. Cavity healing appropriately         Assessment & Plan     In summary this is a pleasant 77 y.o. s/p CWD mastoidectomy AD in only hearing ear.  He is healing well    Recommend follow up locally with an ENT for audio 1 mo post op   Will need regular cavity cleanings PRN  Keep water out of cavity      Tyree Mckeon MD  1/24/2023

## 2023-12-29 ENCOUNTER — CLINICAL SUPPORT (OUTPATIENT)
Dept: AUDIOLOGY | Facility: CLINIC | Age: 78
End: 2023-12-29
Payer: OTHER GOVERNMENT

## 2023-12-29 ENCOUNTER — OFFICE VISIT (OUTPATIENT)
Dept: OTOLARYNGOLOGY | Facility: CLINIC | Age: 78
End: 2023-12-29
Payer: OTHER GOVERNMENT

## 2023-12-29 DIAGNOSIS — H92.11 OTORRHEA, RIGHT: ICD-10-CM

## 2023-12-29 DIAGNOSIS — H90.6 MIXED CONDUCTIVE AND SENSORINEURAL HEARING LOSS, BILATERAL: Primary | ICD-10-CM

## 2023-12-29 DIAGNOSIS — H71.01 CHOLESTEATOMA OF ATTIC OF RIGHT EAR: Primary | ICD-10-CM

## 2023-12-29 PROCEDURE — 99999 PR PBB SHADOW E&M-EST. PATIENT-LVL I: CPT | Mod: PBBFAC,,, | Performed by: AUDIOLOGIST

## 2023-12-29 PROCEDURE — 69220 CLEAN OUT MASTOID CAVITY: CPT | Mod: PBBFAC,RT | Performed by: OTOLARYNGOLOGY

## 2023-12-29 PROCEDURE — 99213 OFFICE O/P EST LOW 20 MIN: CPT | Mod: 25,S$PBB,, | Performed by: OTOLARYNGOLOGY

## 2023-12-29 PROCEDURE — 92557 COMPREHENSIVE HEARING TEST: CPT | Mod: PBBFAC | Performed by: AUDIOLOGIST

## 2023-12-29 PROCEDURE — 92567 TYMPANOMETRY: CPT | Mod: PBBFAC | Performed by: AUDIOLOGIST

## 2023-12-29 PROCEDURE — 99213 PR OFFICE/OUTPT VISIT, EST, LEVL III, 20-29 MIN: ICD-10-PCS | Mod: 25,S$PBB,, | Performed by: OTOLARYNGOLOGY

## 2023-12-29 PROCEDURE — 99211 OFF/OP EST MAY X REQ PHY/QHP: CPT | Mod: PBBFAC | Performed by: AUDIOLOGIST

## 2023-12-29 PROCEDURE — 99213 OFFICE O/P EST LOW 20 MIN: CPT | Mod: 25,PBBFAC,27 | Performed by: OTOLARYNGOLOGY

## 2023-12-29 PROCEDURE — 99999 PR PBB SHADOW E&M-EST. PATIENT-LVL III: CPT | Mod: PBBFAC,,, | Performed by: OTOLARYNGOLOGY

## 2023-12-29 PROCEDURE — 99999 PR PBB SHADOW E&M-EST. PATIENT-LVL I: ICD-10-PCS | Mod: PBBFAC,,, | Performed by: AUDIOLOGIST

## 2023-12-29 PROCEDURE — 69220 PR DEBRIDE, MASTOID CAVITY, SIMPLE: ICD-10-PCS | Mod: S$PBB,RT,, | Performed by: OTOLARYNGOLOGY

## 2023-12-29 PROCEDURE — 69220 CLEAN OUT MASTOID CAVITY: CPT | Mod: S$PBB,RT,, | Performed by: OTOLARYNGOLOGY

## 2023-12-29 PROCEDURE — 99999 PR PBB SHADOW E&M-EST. PATIENT-LVL III: ICD-10-PCS | Mod: PBBFAC,,, | Performed by: OTOLARYNGOLOGY

## 2023-12-29 NOTE — PROGRESS NOTES
Mr. Ronnie Kirby was seen in the clinic today for an audiological evaluation.  Mr. 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  A speech reception threshold was obtained at 35 dBHL for the right ear and at 75 dBHL for the left ear.  Speech discrimination was 100% for the right ear and 40% for the left ear.      Tympanometry testing was attempted for each ear but results could not be obtained due to the inability to maintain a hermetic seal.      Recommendations:  1. Otologic evaluation  2. Annual audiological evaluation  3. Hearing protection when in noise   4. Hearing aid consultation following medical clearance

## 2023-12-29 NOTE — LETTER
December 30, 2023        [facility]  [address]  [facility] [id number]             [facility] [address]  [address]  [facility] [address]  Phone: [phone]  Fax: [phone]   Patient: Ronnie Kirby   MR Number: [id number]   YOB: 1945   Date of Visit: 12/29/2023       Dear Dr. Llc:    Thank you for referring Ronnie Kirby to me for evaluation. Attached you will find relevant portions of my assessment and plan of care.    If you have questions, please do not hesitate to call me. I look forward to following Ronnie Kirby along with you.    Sincerely,      Master, MD Tyree            CC    No Recipients    Enclosure

## 2023-12-31 NOTE — PROGRESS NOTES
Chief Complaint: f/u cholesteatoma    Procedure:    Modified radical mastoidectomy     Operative Findings (including complications, if any):   Attic cholesteatoma with partial erosion of incus  Canal wall down performed with preservation of ossicular chain.    Mastoid partially obliterated with bone anqvi.  Meatoplasty performed    Subjective  Here for f/u AD CWD mastoidectomy with partial cavity obliteration.  Complains of continue drainage reports unable to wear a hearing aid.  Had a recent CT and was referred back due to concern of continued disease.  Was told he hearing has decreased.     Objective:  Vital signs (most recent): There were no vitals taken for this visit.  General appearance: Well-appearing and in no acute distress.      Procedure: Mastoid cavity debridement  Details: Patient brought to the minor procedure room and with the use of the operating microscope the right cavity was cleaned of squamous and cerumenous debris. No evidence of residual or recurrent cholesteatoma. TM intact  small scar band draping onto facial ridge this was cut partially with a myringotomy blade, no retained squamous epithelium.  Small area of granulation in mastoid tip region, this was debrided away usign suction and then cauterized with silver nitrate Patient tolerated procedure well.    CT temporal bones 8/2023 image independently reviewed by me and show: expected post operative changes and findings typical of obliteration, no sign of recurrence     Audios:        Audiogram tracings independently reviewed and discussed with patient shows overall improved hearing from pre op     Assessment & Plan     1. Cholesteatoma of attic of right ear        2. Otorrhea, right            In summary this is a pleasant 78 y.o. s/p CWD mastoidectomy AD in only hearing ear. Was re-referred over concern for otorrhea and possible persistent disease    CT shows expected post op findings, audio improved, exam shows some granulation tissue in  cavity.  Needs continue cavity care.  Debrided today, f/u in 1 mo.  Rx given for compounded CSF powder to apply weekly.      If intolerant of hearing aid could try bone conducting aid.
